# Patient Record
Sex: FEMALE | Race: WHITE | NOT HISPANIC OR LATINO | Employment: FULL TIME | URBAN - METROPOLITAN AREA
[De-identification: names, ages, dates, MRNs, and addresses within clinical notes are randomized per-mention and may not be internally consistent; named-entity substitution may affect disease eponyms.]

---

## 2017-01-05 ENCOUNTER — TELEPHONE (OUTPATIENT)
Dept: OBSTETRICS AND GYNECOLOGY | Facility: CLINIC | Age: 37
End: 2017-01-05

## 2017-01-05 NOTE — TELEPHONE ENCOUNTER
Spoke with pt. She had additional days and a  New form that need to be filled out, pt brought this, form completed and faxed  Back. Pt aware.

## 2017-02-13 ENCOUNTER — APPOINTMENT (OUTPATIENT)
Dept: GENERAL RADIOLOGY | Facility: HOSPITAL | Age: 37
End: 2017-02-13
Attending: OBSTETRICS & GYNECOLOGY

## 2017-02-20 ENCOUNTER — APPOINTMENT (OUTPATIENT)
Dept: GENERAL RADIOLOGY | Facility: HOSPITAL | Age: 37
End: 2017-02-20
Attending: OBSTETRICS & GYNECOLOGY

## 2018-03-07 ENCOUNTER — OFFICE VISIT (OUTPATIENT)
Dept: OBSTETRICS AND GYNECOLOGY | Facility: CLINIC | Age: 38
End: 2018-03-07

## 2018-03-07 ENCOUNTER — TELEPHONE (OUTPATIENT)
Dept: OBSTETRICS AND GYNECOLOGY | Facility: CLINIC | Age: 38
End: 2018-03-07

## 2018-03-07 VITALS
SYSTOLIC BLOOD PRESSURE: 126 MMHG | HEART RATE: 55 BPM | DIASTOLIC BLOOD PRESSURE: 83 MMHG | HEIGHT: 68 IN | WEIGHT: 188 LBS | BODY MASS INDEX: 28.49 KG/M2

## 2018-03-07 DIAGNOSIS — Z01.419 VISIT FOR GYNECOLOGIC EXAMINATION: Primary | ICD-10-CM

## 2018-03-07 PROCEDURE — 99395 PREV VISIT EST AGE 18-39: CPT | Performed by: OBSTETRICS & GYNECOLOGY

## 2018-03-08 NOTE — PROGRESS NOTES
[facility]  [address], [address]  [place] [address]  Phone: [phone] / Fax:  [phone]      2018    [address] [facility] [address] [place] [address]    No Known Provider    Chief Complaint   Patient presents with   • Gynecologic Exam     Annual Exam       Vandana Stark is here for annual gynecologic exam.  HPI - Patient with regular cycles.  She is not sexually active.  Had ectopic over a year ago.  Mother and grandmother with history of breast cancer - mother tested negative for BRCA.    Past Medical History:   Diagnosis Date   • Abnormal Pap smear of cervix    • Ectopic pregnancy    • HPV (human papilloma virus) infection        Past Surgical History:   Procedure Laterality Date   •  SECTION     • DIAGNOSTIC LAPAROSCOPY Right 2016    Procedure: DIAGNOSTIC LAPAROSCOPY FOR ECTOPIC PREGNANCY, RIGHT SALPINGECTOMY ;  Surgeon: Chuy Valdez MD;  Location: [facility];  Service:    • TUBAL ABDOMINAL LIGATION     • TUBAL REANASTOMOSIS         Allergies   Allergen Reactions   • Bee Pollen        Social History     Social History   • Marital status: Single     Spouse name: N/A   • Number of children: N/A   • Years of education: N/A     Occupational History   • Not on file.     Social History Main Topics   • Smoking status: Current Some Day Smoker     Years: 20.00     Types: Cigarettes   • Smokeless tobacco: Not on file      Comment: HAS QUIT WITH ALL PREGNANCIES, BUT STILL SMOKES OCCASIONALLY   • Alcohol use Yes      Comment: occ   • Drug use: No   • Sexual activity: Not Currently     Partners: Male     Birth control/ protection: None     Other Topics Concern   • Not on file     Social History Narrative       Family History   Problem Relation Age of Onset   • Breast cancer Mother    • Hypertension Mother    • Breast cancer Maternal Grandmother        Patient's last menstrual period was 2018 (approximate).    OB History      Para Term  AB  "Living    8 3 3  4 3    SAB TAB Ectopic Multiple Live Births    4              Vitals:    03/07/18 1522   BP: 126/83   Pulse: 55   Weight: 85.3 kg (188 lb)   Height: 172.7 cm (68\")       Physical Exam   Constitutional: She appears well-developed and well-nourished.   Genitourinary: Vagina normal and uterus normal. Pelvic exam was performed with patient supine. There is no tenderness or lesion on the right labia. There is no tenderness or lesion on the left labia. Right adnexum does not display tenderness and does not display fullness. Left adnexum does not display tenderness and does not display fullness. Cervix does not exhibit motion tenderness or lesion.   HENT:   Head: Normocephalic.   Nose: Nose normal.   Eyes: Conjunctivae are normal.   Neck: Normal range of motion. Neck supple. No thyromegaly present.   Cardiovascular: Normal rate, regular rhythm and normal heart sounds.    Pulmonary/Chest: Effort normal. She has no wheezes. She has no rales. Right breast exhibits no mass and no nipple discharge. Left breast exhibits no mass and no nipple discharge.   Abdominal: Soft. There is no tenderness. There is no rebound and no guarding.   Musculoskeletal: Normal range of motion. She exhibits no edema.   Neurological: She is alert. Coordination normal.   Skin: Skin is warm and dry.   Psychiatric: She has a normal mood and affect. Her behavior is normal. Judgment and thought content normal.   Vitals reviewed.      Vandana was seen today for gynecologic exam.    Diagnoses and all orders for this visit:    Visit for gynecologic examination  -     IgP, Aptima HPV - ThinPrep Vial, Cervix  -     Discussed breast awareness and routine screening.  Will arrange for mammogram.        Polo Aponte MD      "

## 2018-03-08 NOTE — TELEPHONE ENCOUNTER
"Lore    Please call her and schedule her for mammogram in our office for \"family history of breast cancer.\"    Thanks    Fay      "

## 2018-03-12 LAB
CYTOLOGIST CVX/VAG CYTO: ABNORMAL
CYTOLOGY CVX/VAG DOC THIN PREP: ABNORMAL
DX ICD CODE: ABNORMAL
DX ICD CODE: ABNORMAL
HIV 1 & 2 AB SER-IMP: ABNORMAL
HPV I/H RISK 4 DNA CVX QL PROBE+SIG AMP: POSITIVE
OTHER STN SPEC: ABNORMAL
PATH REPORT.FINAL DX SPEC: ABNORMAL
PATHOLOGIST CVX/VAG CYTO: ABNORMAL
STAT OF ADQ CVX/VAG CYTO-IMP: ABNORMAL

## 2018-03-14 ENCOUNTER — TELEPHONE (OUTPATIENT)
Dept: OBSTETRICS AND GYNECOLOGY | Facility: CLINIC | Age: 38
End: 2018-03-14

## 2018-03-14 NOTE — TELEPHONE ENCOUNTER
----- Message from Polo Aponte MD sent at 3/12/2018  3:18 PM EDT -----  LAW - Please call patient and let her know that her pap was abnormal (ASCUS HPV positive) and that she will need further testing with colposcopy.

## 2018-06-07 ENCOUNTER — PROCEDURE VISIT (OUTPATIENT)
Dept: OBSTETRICS AND GYNECOLOGY | Facility: CLINIC | Age: 38
End: 2018-06-07

## 2018-06-07 ENCOUNTER — APPOINTMENT (OUTPATIENT)
Dept: WOMENS IMAGING | Facility: HOSPITAL | Age: 38
End: 2018-06-07

## 2018-06-07 VITALS
BODY MASS INDEX: 40.96 KG/M2 | HEIGHT: 55 IN | DIASTOLIC BLOOD PRESSURE: 85 MMHG | WEIGHT: 177 LBS | SYSTOLIC BLOOD PRESSURE: 144 MMHG | HEART RATE: 77 BPM

## 2018-06-07 DIAGNOSIS — N89.8 VAGINAL DISCHARGE: ICD-10-CM

## 2018-06-07 DIAGNOSIS — R87.610 ASCUS WITH POSITIVE HIGH RISK HPV CERVICAL: Primary | ICD-10-CM

## 2018-06-07 DIAGNOSIS — R87.810 ASCUS WITH POSITIVE HIGH RISK HPV CERVICAL: Primary | ICD-10-CM

## 2018-06-07 DIAGNOSIS — Z12.31 VISIT FOR SCREENING MAMMOGRAM: Primary | ICD-10-CM

## 2018-06-07 LAB
B-HCG UR QL: NEGATIVE
INTERNAL NEGATIVE CONTROL: NEGATIVE
INTERNAL POSITIVE CONTROL: POSITIVE
Lab: NORMAL

## 2018-06-07 PROCEDURE — 81025 URINE PREGNANCY TEST: CPT | Performed by: OBSTETRICS & GYNECOLOGY

## 2018-06-07 PROCEDURE — 77067 SCR MAMMO BI INCL CAD: CPT | Performed by: RADIOLOGY

## 2018-06-07 PROCEDURE — 57456 ENDOCERV CURETTAGE W/SCOPE: CPT | Performed by: OBSTETRICS & GYNECOLOGY

## 2018-06-07 PROCEDURE — 77067 SCR MAMMO BI INCL CAD: CPT | Performed by: OBSTETRICS & GYNECOLOGY

## 2018-06-07 NOTE — PROGRESS NOTES
Colposcopy Procedure Note    Indications: Pap smear 3 months ago showed: ASCUS with POSITIVE high risk HPV.  Prior cervical treatment: no treatment.    Procedure Details   The risks and benefits of the procedure and Verbal informed consent obtained.    Speculum placed in vagina and excellent visualization of cervix achieved, cervix swabbed x 3 with acetic acid solution.    Findings:  Cervix: no visible lesions; no biopsies taken, endocervical curettage performed and specimen labelled and sent to pathology.  Vaginal inspection: normal without visible lesions.  Vulvar colposcopy: vulvar colposcopy not performed.    Specimens: endocervical curettage    Complications: none.    Plan:  Specimens labelled and sent to Pathology.  Will base further treatment on Pathology findings.  Treatment options discussed with patient.  Patient had vaginal discharge with odor -- will check labs.    Polo Aponte MD

## 2018-06-11 ENCOUNTER — TELEPHONE (OUTPATIENT)
Dept: OBSTETRICS AND GYNECOLOGY | Facility: CLINIC | Age: 38
End: 2018-06-11

## 2018-06-11 LAB
A VAGINAE DNA VAG QL NAA+PROBE: ABNORMAL SCORE
BVAB2 DNA VAG QL NAA+PROBE: ABNORMAL SCORE
C ALBICANS DNA VAG QL NAA+PROBE: NEGATIVE
C GLABRATA DNA VAG QL NAA+PROBE: NEGATIVE
C TRACH RRNA SPEC QL NAA+PROBE: NEGATIVE
DX ICD CODE: NORMAL
DX ICD CODE: NORMAL
MEGA1 DNA VAG QL NAA+PROBE: ABNORMAL SCORE
N GONORRHOEA RRNA SPEC QL NAA+PROBE: NEGATIVE
PATH REPORT.FINAL DX SPEC: NORMAL
PATH REPORT.GROSS SPEC: NORMAL
PATH REPORT.SITE OF ORIGIN SPEC: NORMAL
PATHOLOGIST NAME: NORMAL
PAYMENT PROCEDURE: NORMAL
T VAGINALIS RRNA SPEC QL NAA+PROBE: NEGATIVE

## 2018-06-11 RX ORDER — METRONIDAZOLE 500 MG/1
500 TABLET ORAL 2 TIMES DAILY
Qty: 14 TABLET | Refills: 0 | Status: SHIPPED | OUTPATIENT
Start: 2018-06-11 | End: 2018-06-18

## 2018-06-11 NOTE — TELEPHONE ENCOUNTER
Left message for pt to call back.  ----- Message from Polo Aponte MD sent at 6/11/2018  9:22 AM EDT -----  LAW - Let her know that she has a bacterial infection.  I called her in Flagyl for one week.

## 2018-06-12 ENCOUNTER — TELEPHONE (OUTPATIENT)
Dept: OBSTETRICS AND GYNECOLOGY | Facility: CLINIC | Age: 38
End: 2018-06-12

## 2018-06-12 NOTE — TELEPHONE ENCOUNTER
Left message for pt to call back.  ----- Message from Polo Aponte MD sent at 6/11/2018  6:45 PM EDT -----  LAW - Let her know that her colposcopy testing was negative.

## 2018-07-05 ENCOUNTER — TELEPHONE (OUTPATIENT)
Dept: OBSTETRICS AND GYNECOLOGY | Facility: CLINIC | Age: 38
End: 2018-07-05

## 2018-07-05 DIAGNOSIS — N91.2 AMENORRHEA: Primary | ICD-10-CM

## 2018-07-05 NOTE — TELEPHONE ENCOUNTER
Pt called asking for a return call back from you. Pt stated she believes she is having a ectopic pregnancy. Pt can be reached at [phone]

## 2018-07-05 NOTE — TELEPHONE ENCOUNTER
Spoke with pt she states her lmp was 6-8-18, making her 4 weeks. Pt took a home ucg that she reports o be positive for the past 2 days. She began bleeding this morning with slight cramping. Pt to do serial HCG levels and will follow-up accordingly.

## 2018-07-06 LAB — HCG INTACT+B SERPL-ACNC: 8 MIU/ML

## 2018-07-10 ENCOUNTER — TELEPHONE (OUTPATIENT)
Dept: OBSTETRICS AND GYNECOLOGY | Facility: CLINIC | Age: 38
End: 2018-07-10

## 2018-07-10 DIAGNOSIS — N91.2 AMENORRHEA: Primary | ICD-10-CM

## 2018-07-10 LAB — HCG INTACT+B SERPL-ACNC: 62 MIU/ML

## 2018-07-10 NOTE — TELEPHONE ENCOUNTER
"LAW    Her hCG levels are rising appropriately.  Initially I recommended an hCG level in 10 days; however, due to the bleeding, I would like her to do another hCG on Thursday or Friday.    Thanks    Fay            Pt called, stating that she has had \"period like bleeding for 4 days along with cramping.\" Pt states that she is not bleeding as much now. Pt is wanting to know if this is something she should be concerned about. Please advise    Pt callback: [phone]  "

## 2018-07-10 NOTE — TELEPHONE ENCOUNTER
Pt states that in past pregnancies she has needed to be put on progesterone. She is wanting to know if that is something she should be on now or if she should just leave everything the way it is.     Call back # [phone]

## 2018-07-10 NOTE — TELEPHONE ENCOUNTER
----- Message from Polo Aponte MD sent at 7/10/2018 11:16 AM EDT -----  LAW - Let her know that her pregnancy hormone levels are rising.  I would like her to do another level in 10 days.  This will be the best prediction of when to schedule ultrasound.  Thanks.

## 2018-07-10 NOTE — TELEPHONE ENCOUNTER
Spoke with pt she will come this Thursday for another draw and wait on progesterone till that result.

## 2018-07-13 ENCOUNTER — TELEPHONE (OUTPATIENT)
Dept: OBSTETRICS AND GYNECOLOGY | Facility: CLINIC | Age: 38
End: 2018-07-13

## 2018-07-13 LAB — HCG INTACT+B SERPL-ACNC: 136 MIU/ML

## 2018-07-13 NOTE — TELEPHONE ENCOUNTER
Josee    Pregnancy hormone levels are doubling and looking good.  Make sure patient has an appt to see me in the next 7 to 10 days.    Thanks    Fay        Pt calling for hCG results from yesterday

## 2018-07-13 NOTE — TELEPHONE ENCOUNTER
Patient with very mild pain on left side.    HCG level only 136.  Levels have been doubling.  I suggested close observation.  She understands ecotpic warnings.  Will recommend ultrasound in approximately 10 days.    Fay          Scheduled  Patient also has concerns since she has had right salpingectomy. She said today she is feeling a dull ache on her left side, she explained it is not necessarily painful and feels similar to aching with ovulation.Otherwise she denies severe pain and is not bleeding. She asked if this could be associated with ectopic/tubal pregnancy?

## 2018-07-15 ENCOUNTER — RESULTS ENCOUNTER (OUTPATIENT)
Dept: OBSTETRICS AND GYNECOLOGY | Facility: CLINIC | Age: 38
End: 2018-07-15

## 2018-07-15 DIAGNOSIS — N91.2 AMENORRHEA: ICD-10-CM

## 2018-07-18 ENCOUNTER — INITIAL PRENATAL (OUTPATIENT)
Dept: OBSTETRICS AND GYNECOLOGY | Facility: CLINIC | Age: 38
End: 2018-07-18

## 2018-07-18 VITALS — SYSTOLIC BLOOD PRESSURE: 113 MMHG | BODY MASS INDEX: 168.72 KG/M2 | WEIGHT: 172 LBS | DIASTOLIC BLOOD PRESSURE: 68 MMHG

## 2018-07-18 DIAGNOSIS — Z34.81 MULTIGRAVIDA IN FIRST TRIMESTER: Primary | ICD-10-CM

## 2018-07-18 DIAGNOSIS — Z87.59 HISTORY OF ECTOPIC PREGNANCY: ICD-10-CM

## 2018-07-18 LAB — HCG INTACT+B SERPL-ACNC: 121 MIU/ML

## 2018-07-18 PROCEDURE — 0501F PRENATAL FLOW SHEET: CPT | Performed by: OBSTETRICS & GYNECOLOGY

## 2018-07-18 RX ORDER — PRENATAL VIT NO.126/IRON/FOLIC 28MG-0.8MG
TABLET ORAL DAILY
COMMUNITY
End: 2020-07-10

## 2018-07-18 NOTE — PROGRESS NOTES
"Cc:  Initial pregnancy visit  Patient was attempting conception.  She has been doing serial hCG levels due to previous history of right ectopic with removal of right tube, in 2016.  C/o \"twinge\" in llq, denies any spotting or bleeding.  Past medical, surgical, obstetrical, genetic, and social histories reviewed.  Allergies and medications reviewed.  Physical exam documented in chart.  Prenatal labs and hCG ordered.  A/P:  IUP at 5 weeks with history of ectopic pregnancy  - Will check hCG today.  Will order sonogram after confirm that hCG is above 1500.  Ectopic warnings given.  - Maternal well being.  Patient has quit smoking.  "

## 2018-07-19 ENCOUNTER — TELEPHONE (OUTPATIENT)
Dept: OBSTETRICS AND GYNECOLOGY | Facility: CLINIC | Age: 38
End: 2018-07-19

## 2018-07-19 DIAGNOSIS — O02.81 INAPPROPRIATE CHANGE IN QUANTITATIVE HCG IN EARLY PREGNANCY: Primary | ICD-10-CM

## 2018-07-19 LAB
ABO GROUP BLD: (no result)
BASOPHILS # BLD AUTO: 0 X10E3/UL (ref 0–0.2)
BASOPHILS NFR BLD AUTO: 0 %
BLD GP AB SCN SERPL QL: NEGATIVE
EOSINOPHIL # BLD AUTO: 0.1 X10E3/UL (ref 0–0.4)
EOSINOPHIL NFR BLD AUTO: 2 %
ERYTHROCYTE [DISTWIDTH] IN BLOOD BY AUTOMATED COUNT: 13.9 % (ref 12.3–15.4)
HBV SURFACE AG SERPL QL IA: NEGATIVE
HCT VFR BLD AUTO: 37.2 % (ref 34–46.6)
HCV AB S/CO SERPL IA: 0.1 S/CO RATIO (ref 0–0.9)
HGB BLD-MCNC: 12.6 G/DL (ref 11.1–15.9)
HIV 1+2 AB+HIV1 P24 AG SERPL QL IA: NON REACTIVE
IMM GRANULOCYTES # BLD: 0 X10E3/UL (ref 0–0.1)
IMM GRANULOCYTES NFR BLD: 0 %
LYMPHOCYTES # BLD AUTO: 1.8 X10E3/UL (ref 0.7–3.1)
LYMPHOCYTES NFR BLD AUTO: 27 %
MCH RBC QN AUTO: 32.2 PG (ref 26.6–33)
MCHC RBC AUTO-ENTMCNC: 33.9 G/DL (ref 31.5–35.7)
MCV RBC AUTO: 95 FL (ref 79–97)
MONOCYTES # BLD AUTO: 0.6 X10E3/UL (ref 0.1–0.9)
MONOCYTES NFR BLD AUTO: 9 %
NEUTROPHILS # BLD AUTO: 4.2 X10E3/UL (ref 1.4–7)
NEUTROPHILS NFR BLD AUTO: 62 %
PLATELET # BLD AUTO: 248 X10E3/UL (ref 150–379)
RBC # BLD AUTO: 3.91 X10E6/UL (ref 3.77–5.28)
RH BLD: POSITIVE
RPR SER QL: NON REACTIVE
RUBV IGG SERPL IA-ACNC: 8.87 INDEX
WBC # BLD AUTO: 6.8 X10E3/UL (ref 3.4–10.8)

## 2018-07-19 NOTE — TELEPHONE ENCOUNTER
Patient with falling hCG.  No major pain issues.  However, has had right ectopic in past with removal of tube.  Patient to do hCG tomorrow.  If hCG levels out, will need sonogram.  Otherwise will follow.  Ectopic warnings given.        Fay            Pt is calling to get her lab results and states that she was told to call this morning and get them. Please advise.

## 2018-07-20 LAB
BACTERIA UR CULT: NORMAL
BACTERIA UR CULT: NORMAL
HCG INTACT+B SERPL-ACNC: 89.93 MIU/ML

## 2018-07-23 ENCOUNTER — TELEPHONE (OUTPATIENT)
Dept: OBSTETRICS AND GYNECOLOGY | Facility: CLINIC | Age: 38
End: 2018-07-23

## 2018-07-23 DIAGNOSIS — O02.81 INAPPROPRIATE CHANGE IN QUANTITATIVE HUMAN CHORIONIC GONADOTROPIN (HCG) IN EARLY PREGNANCY: Primary | ICD-10-CM

## 2018-07-23 NOTE — TELEPHONE ENCOUNTER
----- Message from Polo Aponte MD sent at 7/22/2018  9:55 PM EDT -----  LAW - Let her know that her pregnancy hormone number is down to 90.  I would like her to do another level on Tuesday or Friday.  Thanks.

## 2018-07-27 LAB — HCG INTACT+B SERPL-ACNC: 1.12 MIU/ML

## 2018-07-31 ENCOUNTER — TELEPHONE (OUTPATIENT)
Dept: OBSTETRICS AND GYNECOLOGY | Facility: CLINIC | Age: 38
End: 2018-07-31

## 2018-07-31 NOTE — TELEPHONE ENCOUNTER
Pt aware.7-31-18/lw  ----- Message from Polo Aponte MD sent at 7/30/2018  6:13 PM EDT -----  LAW - Let her know that her labs were normal.  She is no longer pregnant.

## 2018-12-14 ENCOUNTER — OFFICE VISIT (OUTPATIENT)
Dept: OBSTETRICS AND GYNECOLOGY | Facility: CLINIC | Age: 38
End: 2018-12-14

## 2018-12-14 VITALS
SYSTOLIC BLOOD PRESSURE: 122 MMHG | WEIGHT: 178 LBS | DIASTOLIC BLOOD PRESSURE: 80 MMHG | HEIGHT: 68 IN | BODY MASS INDEX: 26.98 KG/M2

## 2018-12-14 DIAGNOSIS — N89.8 VAGINAL DISCHARGE: Primary | ICD-10-CM

## 2018-12-14 DIAGNOSIS — Z20.2 POSSIBLE EXPOSURE TO STD: ICD-10-CM

## 2018-12-14 PROCEDURE — 99213 OFFICE O/P EST LOW 20 MIN: CPT | Performed by: OBSTETRICS & GYNECOLOGY

## 2018-12-14 NOTE — PROGRESS NOTES
Subjective   Vandana Stark is a 38 y.o. female.     Cc:  Vaginal discharge    History of Present Illness - 38 year old female who presents with vaginal discharge.  Patient states that problem has been present for the past 2 to 4 cycles.  Odor also present with intercourse.  It has not resolved on its own.  Patient has concerns about STD and wants to be tested.  She has not tried any OTC medications.    The following portions of the patient's history were reviewed and updated as appropriate:   She  has a past medical history of Abnormal Pap smear of cervix, Ectopic pregnancy, and HPV (human papilloma virus) infection.  She  reports that she has been smoking cigarettes.  She has smoked for the past 20.00 years. She does not have any smokeless tobacco history on file. She reports that she drinks alcohol. She reports that she does not use drugs.  Current Outpatient Medications   Medication Sig Dispense Refill   • Lactobacillus (PROBIOTIC ACIDOPHILUS PO) Take  by mouth.     • Prenatal Vit-Fe Fumarate-FA (PRENATAL, CLASSIC, VITAMIN) 28-0.8 MG tablet tablet Take  by mouth Daily.       No current facility-administered medications for this visit.      She is allergic to bee pollen..    Review of Systems   Constitutional: Negative for chills and fever.   Gastrointestinal: Negative for diarrhea, nausea and vomiting.   Genitourinary: Positive for vaginal discharge. Negative for dysuria, frequency, pelvic pain and vaginal bleeding.       Objective   Physical Exam   Constitutional: She appears well-developed and well-nourished.   Genitourinary: Vagina normal. Pelvic exam was performed with patient supine. There is no tenderness or lesion on the right labia. There is no tenderness or lesion on the left labia. Cervix exhibits no motion tenderness, no discharge and no friability.   Psychiatric: She has a normal mood and affect. Her behavior is normal. Judgment and thought content normal.   Vitals reviewed.      Assessment/Plan    Vandana was seen today for vaginitis.    Diagnoses and all orders for this visit:    Vaginal discharge  -     NuSwab VG+ - Swab, Vagina  -     Await testing and treat based on cultures.    Possible exposure to STD  -     NuSwab VG+ - Swab, Vagina  -     HIV-1 / O / 2 Ag / Antibody 4th Generation  -     RPR  -     Await testing.    Polo Aponte MD

## 2018-12-15 LAB
HIV 1+2 AB+HIV1 P24 AG SERPL QL IA: NON REACTIVE
RPR SER QL: NORMAL

## 2018-12-18 ENCOUNTER — TELEPHONE (OUTPATIENT)
Dept: OBSTETRICS AND GYNECOLOGY | Facility: CLINIC | Age: 38
End: 2018-12-18

## 2018-12-18 NOTE — TELEPHONE ENCOUNTER
Patient called asking for Karen, she is wanting her test results. She can be reached at [phone] until 4 PM, or if after her cell phone [phone].

## 2018-12-19 ENCOUNTER — TELEPHONE (OUTPATIENT)
Dept: OBSTETRICS AND GYNECOLOGY | Facility: CLINIC | Age: 38
End: 2018-12-19

## 2018-12-19 LAB
A VAGINAE DNA VAG QL NAA+PROBE: ABNORMAL SCORE
BVAB2 DNA VAG QL NAA+PROBE: ABNORMAL SCORE
C ALBICANS DNA VAG QL NAA+PROBE: NEGATIVE
C GLABRATA DNA VAG QL NAA+PROBE: NEGATIVE
C TRACH RRNA SPEC QL NAA+PROBE: NEGATIVE
MEGA1 DNA VAG QL NAA+PROBE: ABNORMAL SCORE
N GONORRHOEA RRNA SPEC QL NAA+PROBE: NEGATIVE
T VAGINALIS RRNA SPEC QL NAA+PROBE: NEGATIVE

## 2018-12-19 RX ORDER — METRONIDAZOLE 500 MG/1
500 TABLET ORAL 2 TIMES DAILY
Qty: 14 TABLET | Refills: 0 | Status: SHIPPED | OUTPATIENT
Start: 2018-12-19 | End: 2018-12-26

## 2018-12-19 NOTE — TELEPHONE ENCOUNTER
Left message for pt to call back. 12-19-18/lw  ----- Message from Polo Aponte MD sent at 12/19/2018  7:39 AM EST -----  LAW - Let her know that she has a bacterial infection.  I called in Flagyl for patient.  Thanks.

## 2020-07-10 ENCOUNTER — OFFICE VISIT (OUTPATIENT)
Dept: OBSTETRICS AND GYNECOLOGY | Facility: CLINIC | Age: 40
End: 2020-07-10

## 2020-07-10 VITALS
SYSTOLIC BLOOD PRESSURE: 158 MMHG | WEIGHT: 181 LBS | DIASTOLIC BLOOD PRESSURE: 88 MMHG | HEIGHT: 69 IN | BODY MASS INDEX: 26.81 KG/M2

## 2020-07-10 DIAGNOSIS — Z01.419 ENCOUNTER FOR GYNECOLOGICAL EXAMINATION: Primary | ICD-10-CM

## 2020-07-10 DIAGNOSIS — N63.20 BILATERAL BREAST LUMP: ICD-10-CM

## 2020-07-10 DIAGNOSIS — R87.610 ASCUS WITH POSITIVE HIGH RISK HPV CERVICAL: ICD-10-CM

## 2020-07-10 DIAGNOSIS — R87.810 ASCUS WITH POSITIVE HIGH RISK HPV CERVICAL: ICD-10-CM

## 2020-07-10 DIAGNOSIS — Z72.0 TOBACCO USE: ICD-10-CM

## 2020-07-10 DIAGNOSIS — N63.10 BILATERAL BREAST LUMP: ICD-10-CM

## 2020-07-10 DIAGNOSIS — N89.8 VAGINAL DISCHARGE: ICD-10-CM

## 2020-07-10 DIAGNOSIS — Z11.3 SCREENING EXAMINATION FOR STD (SEXUALLY TRANSMITTED DISEASE): ICD-10-CM

## 2020-07-10 PROCEDURE — 99395 PREV VISIT EST AGE 18-39: CPT | Performed by: OBSTETRICS & GYNECOLOGY

## 2020-07-10 NOTE — PATIENT INSTRUCTIONS
Steps to Quit Smoking  Smoking tobacco is the leading cause of preventable death. It can affect almost every organ in the body. Smoking puts you and those around you at risk for developing many serious chronic diseases. Quitting smoking can be difficult, but it is one of the best things that you can do for your health. It is never too late to quit.  How do I get ready to quit?  When you decide to quit smoking, create a plan to help you succeed. Before you quit:  · Pick a date to quit. Set a date within the next 2 weeks to give you time to prepare.  · Write down the reasons why you are quitting. Keep this list in places where you will see it often.  · Tell your family, friends, and co-workers that you are quitting. Support from your loved ones can make quitting easier.  · Talk with your health care provider about your options for quitting smoking.  · Find out what treatment options are covered by your health insurance.  · Identify people, places, things, and activities that make you want to smoke (triggers). Avoid them.  What first steps can I take to quit smoking?  · Throw away all cigarettes at home, at work, and in your car.  · Throw away smoking accessories, such as ashtrays and lighters.  · Clean your car. Make sure to empty the ashtray.  · Clean your home, including curtains and carpets.  What strategies can I use to quit smoking?  Talk with your health care provider about combining strategies, such as taking medicines while you are also receiving in-person counseling. Using these two strategies together makes you more likely to succeed in quitting than if you used either strategy on its own.  · If you are pregnant or breastfeeding, talk with your health care provider about finding counseling or other support strategies to quit smoking. Do not take medicine to help you quit smoking unless your health care provider tells you to do so.  To quit smoking:  Quit right away  · Quit smoking completely, instead of  gradually reducing how much you smoke over a period of time. Research shows that stopping smoking right away is more successful than gradually quitting.  · Attend in-person counseling to help you build problem-solving skills. You are more likely to succeed in quitting if you attend counseling sessions regularly. Even short sessions of 10 minutes can be effective.  Take medicine  You may take medicines to help you quit smoking. Some medicines require a prescription and some you can purchase over-the-counter. Medicines may have nicotine in them to replace the nicotine in cigarettes. Medicines may:  · Help to stop cravings.  · Help to relieve withdrawal symptoms.  Your health care provider may recommend:  · Nicotine patches, gum, or lozenges.  · Nicotine inhalers or sprays.  · Non-nicotine medicine that is taken by mouth.  Find resources  Find resources and support systems that can help you to quit smoking and remain smoke-free after you quit. These resources are most helpful when you use them often. They include:  · Online chats with a counselor.  · Telephone quitlines.  · Printed self-help materials.  · Support groups or group counseling.  · Text messaging programs.  · Mobile phone apps or applications. Use apps that can help you stick to your quit plan by providing reminders, tips, and encouragement. There are many free apps for mobile devices as well as websites. Examples include Quit Guide from the CDC and smokefree.gov  What things can I do to make it easier to quit?    · Reach out to your family and friends for support and encouragement. Call telephone quitlines (3-181-QUIT-NOW), reach out to support groups, or work with a counselor for support.  · Ask people who smoke to avoid smoking around you.  · Avoid places that trigger you to smoke, such as bars, parties, or smoke-break areas at work.  · Spend time with people who do not smoke.  · Lessen the stress in your life. Stress can be a smoking trigger for some  people. To lessen stress, try:  ? Exercising regularly.  ? Doing deep-breathing exercises.  ? Doing yoga.  ? Meditating.  ? Performing a body scan. This involves closing your eyes, scanning your body from head to toe, and noticing which parts of your body are particularly tense. Try to relax the muscles in those areas.  How will I feel when I quit smoking?  Day 1 to 3 weeks  Within the first 24 hours of quitting smoking, you may start to feel withdrawal symptoms. These symptoms are usually most noticeable 2-3 days after quitting, but they usually do not last for more than 2-3 weeks. You may experience these symptoms:  · Mood swings.  · Restlessness, anxiety, or irritability.  · Trouble concentrating.  · Dizziness.  · Strong cravings for sugary foods and nicotine.  · Mild weight gain.  · Constipation.  · Nausea.  · Coughing or a sore throat.  · Changes in how the medicines that you take for unrelated issues work in your body.  · Depression.  · Trouble sleeping (insomnia).  Week 3 and afterward  After the first 2-3 weeks of quitting, you may start to notice more positive results, such as:  · Improved sense of smell and taste.  · Decreased coughing and sore throat.  · Slower heart rate.  · Lower blood pressure.  · Clearer skin.  · The ability to breathe more easily.  · Fewer sick days.  Quitting smoking can be very challenging. Do not get discouraged if you are not successful the first time. Some people need to make many attempts to quit before they achieve long-term success. Do your best to stick to your quit plan, and talk with your health care provider if you have any questions or concerns.  Summary  · Smoking tobacco is the leading cause of preventable death. Quitting smoking is one of the best things that you can do for your health.  · When you decide to quit smoking, create a plan to help you succeed.  · Quit smoking right away, not slowly over a period of time.  · When you start quitting, seek help from your  health care provider, family, or friends.  This information is not intended to replace advice given to you by your health care provider. Make sure you discuss any questions you have with your health care provider.  Document Released: 12/12/2002 Document Revised: 03/06/2020 Document Reviewed: 03/07/2020  Elsevier Patient Education © 2020 [employer] Inc[employer]

## 2020-07-10 NOTE — PROGRESS NOTES
[facility]  [address], [address]  [place], [place] [address]  Phone: [phone] / Fax:  [phone]      07/10/2020    [facility] [address] [place] [address]    Provider, No Known    Chief Complaint   Patient presents with   • Gynecologic Exam     Annual Exam, last pap 3-7-18 Ascus Hpv (+), colpo Ecc 6-7-18 nl.. Patient requesting STD cultures, she is c/o vaginal odor.       Vandana Cancino is here for annual gynecologic exam.  HPI - Patient needs pap test for follow up to ASCUS HPV positive.  In addition, she had possible exposure to STD recently with an episode of unprotected intercourse.  Following episode, she developed a persistent vaginal odor.  She has not tried any OTC medications.  She is also concerned about bilateral breast lumps and has not had a mammogram in several years but has had previous benign biopsies in past.    Past Medical History:   Diagnosis Date   • Abnormal Pap smear of cervix    • Chlamydia    • Ectopic pregnancy    • Genital warts    • HPV (human papilloma virus) infection        Past Surgical History:   Procedure Laterality Date   •  SECTION     • DIAGNOSTIC LAPAROSCOPY Right 2016    Procedure: DIAGNOSTIC LAPAROSCOPY FOR ECTOPIC PREGNANCY, RIGHT SALPINGECTOMY ;  Surgeon: Chuy Valdez MD;  Location: [facility];  Service:    • ECTOPIC PREGNANCY     • TUBAL ABDOMINAL LIGATION     • TUBAL REANASTOMOSIS         Allergies   Allergen Reactions   • Bee Pollen        Social History     Socioeconomic History   • Marital status: Single     Spouse name: Not on file   • Number of children: Not on file   • Years of education: Not on file   • Highest education level: Not on file   Tobacco Use   • Smoking status: Current Some Day Smoker     Years: 20.00     Types: Cigarettes   • Tobacco comment: HAS QUIT WITH ALL PREGNANCIES, BUT STILL SMOKES OCCASIONALLY   Substance and Sexual Activity   • Alcohol use: Yes     Comment: occ   • Drug use: Yes      "Types: Marijuana   • Sexual activity: Yes     Partners: Male     Birth control/protection: None       Family History   Problem Relation Age of Onset   • Breast cancer Mother    • Hypertension Mother    • Hyperlipidemia Mother    • Breast cancer Maternal Grandmother        No LMP recorded.    OB History        9    Para   3    Term   3            AB   4    Living   3       SAB   4    TAB        Ectopic        Molar        Multiple        Live Births                    Vitals:    07/10/20 1101   BP: 158/88   Weight: 82.1 kg (181 lb)   Height: 175.3 cm (69\")       Physical Exam   Constitutional: She appears well-developed and well-nourished.   Genitourinary: Uterus normal. Pelvic exam was performed with patient supine. There is no tenderness or lesion on the right labia. There is no tenderness or lesion on the left labia. Vaginal discharge found. Right adnexum does not display tenderness and does not display fullness. Left adnexum does not display tenderness and does not display fullness. Cervix does not exhibit motion tenderness or lesion.   HENT:   Right Ear: External ear normal.   Left Ear: External ear normal.   Nose: Nose normal.   Eyes: Conjunctivae are normal.   Neck: Normal range of motion. Neck supple. No thyromegaly present.   Cardiovascular: Normal rate, regular rhythm and normal heart sounds.   Pulmonary/Chest: Effort normal. No stridor. She has no wheezes. Right breast exhibits no mass and no nipple discharge. Left breast exhibits no mass and no nipple discharge.   Abdominal: Soft. She exhibits no mass. There is no rebound and no guarding.   Musculoskeletal: Normal range of motion. She exhibits no edema.   Neurological: She is alert. Coordination normal.   Skin: Skin is warm and dry.   Psychiatric: She has a normal mood and affect. Her behavior is normal. Judgment and thought content normal.   Vitals reviewed.      Vandana was seen today for gynecologic exam.    Diagnoses and all orders for " this visit:    Encounter for gynecological examination        -     Discussed importance of regular screening and breast awareness.  Screening examination for STD (sexually transmitted disease)/Vaginal discharge  -     NuSwab VG+ - Swab, Vagina  -     Await cultures.  Tobacco use        -     Discussed importance of quitting smoking.  Patient has plan to wean.  Bilateral breast lump  -     Mammo Diagnostic Digital Tomosynthesis Bilateral With CAD; Future  -     Patient to do work up for breast lumps.  ASCUS with positive high risk HPV cervical  -     IgP, Aptima HPV        Polo Aponte MD

## 2020-07-16 LAB
CYTOLOGIST CVX/VAG CYTO: NORMAL
CYTOLOGY CVX/VAG DOC CYTO: NORMAL
CYTOLOGY CVX/VAG DOC THIN PREP: NORMAL
DX ICD CODE: NORMAL
HIV 1 & 2 AB SER-IMP: NORMAL
HPV I/H RISK 4 DNA CVX QL PROBE+SIG AMP: NEGATIVE
OTHER STN SPEC: NORMAL
STAT OF ADQ CVX/VAG CYTO-IMP: NORMAL

## 2020-07-20 ENCOUNTER — TELEPHONE (OUTPATIENT)
Dept: OBSTETRICS AND GYNECOLOGY | Facility: CLINIC | Age: 40
End: 2020-07-20

## 2020-07-20 LAB
A VAGINAE DNA VAG QL NAA+PROBE: ABNORMAL SCORE
BVAB2 DNA VAG QL NAA+PROBE: ABNORMAL SCORE
C ALBICANS DNA VAG QL NAA+PROBE: NEGATIVE
C GLABRATA DNA VAG QL NAA+PROBE: NEGATIVE
C TRACH DNA VAG QL NAA+PROBE: NEGATIVE
MEGA1 DNA VAG QL NAA+PROBE: ABNORMAL SCORE
N GONORRHOEA DNA VAG QL NAA+PROBE: NEGATIVE
T VAGINALIS DNA VAG QL NAA+PROBE: NEGATIVE

## 2020-07-20 RX ORDER — METRONIDAZOLE 500 MG/1
500 TABLET ORAL 2 TIMES DAILY
Qty: 14 TABLET | Refills: 0 | Status: SHIPPED | OUTPATIENT
Start: 2020-07-20 | End: 2020-07-21 | Stop reason: SDUPTHER

## 2020-07-20 NOTE — TELEPHONE ENCOUNTER
Audrey    Let her know that she has a bacterial infection.  I called in antibiotics.    Thanks    Fay

## 2020-07-21 RX ORDER — METRONIDAZOLE 500 MG/1
500 TABLET ORAL 2 TIMES DAILY
Qty: 14 TABLET | Refills: 0 | Status: SHIPPED | OUTPATIENT
Start: 2020-07-21 | End: 2020-07-28

## 2020-07-21 NOTE — TELEPHONE ENCOUNTER
Pt aware of rx and was able to patch her through to carlos to answer her questions about breast center.....

## 2020-07-21 NOTE — TELEPHONE ENCOUNTER
Patient called back and is aware but she states that she is using a new pharmacy wanted to know if this could be routed to [facility]. Patient also states that she was suppose to get a call back from breast center and has not heard anything so she wanted to follow up on this?   English

## 2020-09-03 ENCOUNTER — TELEPHONE (OUTPATIENT)
Dept: OBSTETRICS AND GYNECOLOGY | Facility: CLINIC | Age: 40
End: 2020-09-03

## 2020-09-03 ENCOUNTER — APPOINTMENT (OUTPATIENT)
Dept: WOMENS IMAGING | Facility: HOSPITAL | Age: 40
End: 2020-09-03

## 2020-09-03 DIAGNOSIS — Z80.3 FAMILY HISTORY OF BREAST CANCER: Primary | ICD-10-CM

## 2020-09-03 PROCEDURE — 77067 SCR MAMMO BI INCL CAD: CPT | Performed by: RADIOLOGY

## 2020-09-03 PROCEDURE — 77063 BREAST TOMOSYNTHESIS BI: CPT | Performed by: RADIOLOGY

## 2020-09-03 NOTE — TELEPHONE ENCOUNTER
Patient wants a call back she said she went in for further imaging today on her breast and she said that while there they had brought up her mammogram in 2018 stating that they had found concerning imaging and she was suppose to have came back then to do further imaging and that she had not. Patient is very concerned about this she says she doesn't believe she was informed of needing further imaging or that she ever went and she has a family history so is very nervous bout the situation. She wanted to follow up on this because she said she wasn't having issues back in 2018 like she is now and is worried that she waited to be seen when she shouldn't have.

## 2020-09-03 NOTE — TELEPHONE ENCOUNTER
Kendal    I had a long conversation with this patient.    We obtained documentation that she was contacted and was sent a letter to come for breast follow up.  She declined to do so, according to [facility] records.  I explained this to the patient and she had an ectopic pregnancy at the time of her abnormal testing 2 years ago.  It was an emotional time for her and for whatever reason, she did not follow through.      Today, she went to [facility] and her mammogram looks good.  She still has a left breast lump but it appears stable/benign.  The radiologist at [facility] recommends a breast MRI because of her history and strong family history.  I put orders in Epic.  She will likely need a pre-authorization.  Can you arrange for this?    Thanks so much.    Fay

## 2020-09-04 DIAGNOSIS — N63.20 BILATERAL BREAST LUMP: ICD-10-CM

## 2020-09-04 DIAGNOSIS — N63.10 BILATERAL BREAST LUMP: ICD-10-CM

## 2020-09-08 NOTE — TELEPHONE ENCOUNTER
Per [employer] insurance, no auth required for CPT 65542 (bilateral breast mri).  Noted in referral/set for scheduling at [facility].    Reference # [id number]

## 2020-09-25 ENCOUNTER — APPOINTMENT (OUTPATIENT)
Dept: MRI IMAGING | Facility: HOSPITAL | Age: 40
End: 2020-09-25